# Patient Record
Sex: MALE | Race: BLACK OR AFRICAN AMERICAN | Employment: FULL TIME | URBAN - METROPOLITAN AREA
[De-identification: names, ages, dates, MRNs, and addresses within clinical notes are randomized per-mention and may not be internally consistent; named-entity substitution may affect disease eponyms.]

---

## 2017-03-16 ENCOUNTER — HOSPITAL ENCOUNTER (OUTPATIENT)
Dept: LAB | Age: 32
Discharge: HOME | End: 2017-03-16
Payer: SELF-PAY

## 2017-03-16 LAB
ALBUMIN SERPL BCP-MCNC: 3.9 G/DL (ref 3.4–5)
ALBUMIN/GLOB SERPL: 1 {RATIO} (ref 0.8–1.7)
ALP SERPL-CCNC: 74 U/L (ref 45–117)
ALT SERPL-CCNC: 71 U/L (ref 16–61)
ANION GAP BLD CALC-SCNC: 4 MMOL/L (ref 3–18)
AST SERPL W P-5'-P-CCNC: 33 U/L (ref 15–37)
BASOPHILS # BLD AUTO: 0 K/UL (ref 0–0.1)
BASOPHILS # BLD: 0 % (ref 0–2)
BILIRUB SERPL-MCNC: 0.2 MG/DL (ref 0.2–1)
BUN SERPL-MCNC: 14 MG/DL (ref 7–18)
BUN/CREAT SERPL: 12 (ref 12–20)
CALCIUM SERPL-MCNC: 8.8 MG/DL (ref 8.5–10.1)
CHLORIDE SERPL-SCNC: 109 MMOL/L (ref 100–108)
CHOLEST SERPL-MCNC: 179 MG/DL
CO2 SERPL-SCNC: 30 MMOL/L (ref 21–32)
CREAT SERPL-MCNC: 1.14 MG/DL (ref 0.6–1.3)
DIFFERENTIAL METHOD BLD: ABNORMAL
EOSINOPHIL # BLD: 0.1 K/UL (ref 0–0.4)
EOSINOPHIL NFR BLD: 2 % (ref 0–5)
ERYTHROCYTE [DISTWIDTH] IN BLOOD BY AUTOMATED COUNT: 14.7 % (ref 11.6–14.5)
GLOBULIN SER CALC-MCNC: 3.9 G/DL (ref 2–4)
GLUCOSE SERPL-MCNC: 104 MG/DL (ref 74–99)
HCT VFR BLD AUTO: 43.1 % (ref 36–48)
HDLC SERPL-MCNC: 46 MG/DL (ref 40–60)
HDLC SERPL: 3.9 {RATIO} (ref 0–5)
HGB BLD-MCNC: 13.9 G/DL (ref 13–16)
LDLC SERPL CALC-MCNC: 110.2 MG/DL (ref 0–100)
LIPID PROFILE,FLP: ABNORMAL
LYMPHOCYTES # BLD AUTO: 41 % (ref 21–52)
LYMPHOCYTES # BLD: 2.2 K/UL (ref 0.9–3.6)
MCH RBC QN AUTO: 27 PG (ref 24–34)
MCHC RBC AUTO-ENTMCNC: 32.3 G/DL (ref 31–37)
MCV RBC AUTO: 83.9 FL (ref 74–97)
MONOCYTES # BLD: 0.6 K/UL (ref 0.05–1.2)
MONOCYTES NFR BLD AUTO: 11 % (ref 3–10)
NEUTS SEG # BLD: 2.4 K/UL (ref 1.8–8)
NEUTS SEG NFR BLD AUTO: 46 % (ref 40–73)
PLATELET # BLD AUTO: 317 K/UL (ref 135–420)
PMV BLD AUTO: 11 FL (ref 9.2–11.8)
POTASSIUM SERPL-SCNC: 4.1 MMOL/L (ref 3.5–5.5)
PROT SERPL-MCNC: 7.8 G/DL (ref 6.4–8.2)
RBC # BLD AUTO: 5.14 M/UL (ref 4.7–5.5)
SODIUM SERPL-SCNC: 143 MMOL/L (ref 136–145)
TRIGL SERPL-MCNC: 114 MG/DL (ref ?–150)
VLDLC SERPL CALC-MCNC: 22.8 MG/DL
WBC # BLD AUTO: 5.4 K/UL (ref 4.6–13.2)

## 2017-03-16 PROCEDURE — 36415 COLL VENOUS BLD VENIPUNCTURE: CPT | Performed by: INTERNAL MEDICINE

## 2017-03-16 PROCEDURE — 85025 COMPLETE CBC W/AUTO DIFF WBC: CPT | Performed by: INTERNAL MEDICINE

## 2017-03-16 PROCEDURE — 80053 COMPREHEN METABOLIC PANEL: CPT | Performed by: INTERNAL MEDICINE

## 2017-03-16 PROCEDURE — 80061 LIPID PANEL: CPT | Performed by: INTERNAL MEDICINE

## 2017-03-24 ENCOUNTER — HOSPITAL ENCOUNTER (OUTPATIENT)
Dept: LAB | Age: 32
Discharge: HOME | End: 2017-03-24
Payer: SELF-PAY

## 2017-03-24 LAB
ANION GAP BLD CALC-SCNC: 9 MMOL/L (ref 3–18)
BUN SERPL-MCNC: 22 MG/DL (ref 7–18)
BUN/CREAT SERPL: 19 (ref 12–20)
CALCIUM SERPL-MCNC: 9.1 MG/DL (ref 8.5–10.1)
CHLORIDE SERPL-SCNC: 109 MMOL/L (ref 100–108)
CO2 SERPL-SCNC: 27 MMOL/L (ref 21–32)
CREAT SERPL-MCNC: 1.18 MG/DL (ref 0.6–1.3)
ERYTHROCYTE [DISTWIDTH] IN BLOOD BY AUTOMATED COUNT: 14.8 % (ref 11.6–14.5)
GLUCOSE SERPL-MCNC: 106 MG/DL (ref 74–99)
HCT VFR BLD AUTO: 44.7 % (ref 36–48)
HGB BLD-MCNC: 14.7 G/DL (ref 13–16)
MCH RBC QN AUTO: 27.2 PG (ref 24–34)
MCHC RBC AUTO-ENTMCNC: 32.9 G/DL (ref 31–37)
MCV RBC AUTO: 82.6 FL (ref 74–97)
PLATELET # BLD AUTO: 283 K/UL (ref 135–420)
PMV BLD AUTO: 10.8 FL (ref 9.2–11.8)
POTASSIUM SERPL-SCNC: 4.8 MMOL/L (ref 3.5–5.5)
RBC # BLD AUTO: 5.41 M/UL (ref 4.7–5.5)
SODIUM SERPL-SCNC: 145 MMOL/L (ref 136–145)
WBC # BLD AUTO: 6.6 K/UL (ref 4.6–13.2)

## 2017-03-24 PROCEDURE — 85027 COMPLETE CBC AUTOMATED: CPT | Performed by: SURGERY

## 2017-03-24 PROCEDURE — 36415 COLL VENOUS BLD VENIPUNCTURE: CPT | Performed by: SURGERY

## 2017-03-24 PROCEDURE — 80048 BASIC METABOLIC PNL TOTAL CA: CPT | Performed by: SURGERY

## 2017-09-03 ENCOUNTER — HOSPITAL ENCOUNTER (EMERGENCY)
Age: 32
Discharge: HOME | End: 2017-09-03
Attending: EMERGENCY MEDICINE
Payer: SELF-PAY

## 2017-09-03 VITALS
BODY MASS INDEX: 30.53 KG/M2 | TEMPERATURE: 98.4 F | HEIGHT: 66 IN | DIASTOLIC BLOOD PRESSURE: 66 MMHG | WEIGHT: 190 LBS | RESPIRATION RATE: 18 BRPM | SYSTOLIC BLOOD PRESSURE: 115 MMHG | HEART RATE: 74 BPM | OXYGEN SATURATION: 98 %

## 2017-09-03 DIAGNOSIS — K08.89 DENTALGIA: Primary | ICD-10-CM

## 2017-09-03 PROCEDURE — 99282 EMERGENCY DEPT VISIT SF MDM: CPT

## 2017-09-03 RX ORDER — PENICILLIN V POTASSIUM 500 MG/1
500 TABLET, FILM COATED ORAL 4 TIMES DAILY
Qty: 28 TAB | Refills: 0 | Status: SHIPPED | OUTPATIENT
Start: 2017-09-03 | End: 2017-09-10

## 2017-09-03 RX ORDER — TRAMADOL HYDROCHLORIDE 50 MG/1
50 TABLET ORAL
Qty: 5 TAB | Refills: 0 | Status: SHIPPED | OUTPATIENT
Start: 2017-09-03 | End: 2018-02-08

## 2017-09-03 NOTE — ED PROVIDER NOTES
HPI Comments: 9:26 AM Robinson Johnson. is a 27 y.o. male with history of HTN who presents to the ED c/o tooth and jaw pain. Pt reports that his L mandibular molar broke \"a while ago\" but 1 week ago he began to experience swelling and pain on the L side of his mouth. Pt notes he has been taking Ibuprofen and Tylenol with minimal relief and has not seen a dentist. He also had recent hernia surgery. Pt denies fever, chills, N/VD or any other acute sx. PCP: Miki Benton M.D     The history is provided by the patient. No  was used. Past Medical History:   Diagnosis Date    GERD (gastroesophageal reflux disease)     Hypertension     Inguinal hernia        History reviewed. No pertinent surgical history. Family History:   Problem Relation Age of Onset    Diabetes Mother     Hypertension Mother        Social History     Social History    Marital status:      Spouse name: N/A    Number of children: N/A    Years of education: N/A     Occupational History    Not on file. Social History Main Topics    Smoking status: Current Every Day Smoker     Packs/day: 0.50     Years: 10.00    Smokeless tobacco: Never Used    Alcohol use Yes      Comment: states he drinks twice a week    Drug use: No    Sexual activity: Yes     Partners: Female     Other Topics Concern    Not on file     Social History Narrative         ALLERGIES: Erythromycin and Azithromycin    Review of Systems   Constitutional: Negative. Negative for chills and fever. HENT: Positive for dental problem and facial swelling (L sided jaw). Negative for congestion. Eyes: Negative. Negative for visual disturbance. Respiratory: Negative. Negative for chest tightness and shortness of breath. Cardiovascular: Negative. Negative for chest pain and leg swelling. Gastrointestinal: Negative. Negative for abdominal pain, diarrhea, nausea and vomiting. Genitourinary: Negative.   Negative for difficulty urinating and dysuria. Musculoskeletal: Negative. Negative for back pain and myalgias. Skin: Negative. Negative for rash and wound. Neurological: Negative. Negative for dizziness, speech difficulty, weakness and light-headedness. Psychiatric/Behavioral: Negative. Negative for self-injury. All other systems reviewed and are negative. Vitals:    09/03/17 0913   BP: 115/66   Pulse: 74   Resp: 18   Temp: 98.4 °F (36.9 °C)   SpO2: 98%   Weight: 86.2 kg (190 lb)   Height: 5' 6\" (1.676 m)            Physical Exam   Constitutional: He appears well-developed and well-nourished. No distress. HENT:   Head: Normocephalic and atraumatic. Mouth/Throat: Oropharynx is clear and moist and mucous membranes are normal. No trismus in the jaw. Dental abscesses and dental caries present. No oropharyngeal exudate or posterior oropharyngeal edema.       (+) percussion tenderness   Eyes: EOM are normal. No scleral icterus. Neck: Normal range of motion. Neck supple. No JVD present. No thyromegaly present. Cardiovascular: Normal rate, regular rhythm, S1 normal and S2 normal.  Exam reveals no gallop and no friction rub. No murmur heard. Pulmonary/Chest: Effort normal and breath sounds normal. No accessory muscle usage. No respiratory distress. Abdominal: Soft. Normal appearance. He exhibits no distension. There is no tenderness. There is no rigidity, no rebound and no guarding. Musculoskeletal: Normal range of motion. He exhibits no edema or tenderness. Neurological: He is alert. He has normal strength. No sensory deficit. Coordination normal.   Skin: Skin is warm and intact. No rash noted. Psychiatric: He has a normal mood and affect. His speech is normal and behavior is normal.   Vitals reviewed.        MDM  Number of Diagnoses or Management Options  Dentalgia:   Diagnosis management comments: No drainable abscess on exam, will put on course of abx and encourage f/u with dentist.  Discussed return precautions. ED Course       Procedures           Vitals:  Patient Vitals for the past 12 hrs:   Temp Pulse Resp BP SpO2   09/03/17 0913 98.4 °F (36.9 °C) 74 18 115/66 98 %        Medications ordered:   Medications - No data to display      Lab findings:  No results found for this or any previous visit (from the past 12 hour(s)). EKG interpretation by ED Physician:       X-Ray, CT or other radiology findings or impressions:  No orders to display       Orders:  No orders of the defined types were placed in this encounter. Reevaluation, Progress notes, Consult notes, or additional Procedure notes:       Disposition:  Diagnosis: No diagnosis found. Disposition:     Follow-up Information     None           Patient's Medications   Start Taking    No medications on file   Continue Taking    HYDROCHLOROTHIAZIDE (HYDRODIURIL) 12.5 MG TABLET    Take 12.5 mg by mouth daily. These Medications have changed    No medications on file   Stop Taking    NAPROXEN (NAPROSYN) 500 MG TABLET    Take 500 mg by mouth two (2) times daily (with meals). OMEPRAZOLE (PRILOSEC) 20 MG CAPSULE    Take 20 mg by mouth daily. OXYCODONE-ACETAMINOPHEN (PERCOCET) 5-325 MG PER TABLET    Take 1 Tab by mouth every four (4) hours as needed for Pain. Max Daily Amount: 6 Tabs. Scribe Attestation           Katt Pickens acting as a scribe for and in the presence of Coty Marie MD              September 03, 2017 at 9:25 AM                            Provider Attestation:           I personally performed the services described in the documentation, reviewed the documentation, as recorded by the scribe in my presence, and it accurately and completely records my words and actions.  September 03, 2017 at 9:25 AM - Coty Marie MD

## 2017-09-03 NOTE — DISCHARGE INSTRUCTIONS
IF YOU HAVE SEVERE PAIN, FEVER, TROUBLE SWALLOWING OR BREATHING, OR ANY OTHER WORRYING SIGNS THEN RETURN TO THE ER RIGHT AWAY. Tooth and Gum Pain: Care Instructions  Your Care Instructions    The most common causes of dental pain are tooth decay and gum disease. Pain can also be caused by an infection of the tooth (abscess) or the gums. Or you may have pain from a broken or cracked tooth. Other causes of pain include infection and damage to a tooth from nervous grinding of your teeth. A wisdom tooth can be painful when it is coming in but cannot break through the gum. It can also be painful when the tooth is only partway in and extra gum tissue has formed around it. The tissue can get inflamed (pericoronitis), and sometimes it gets infected. Prompt dental care can help find the cause of your toothache and keep the tooth from dying or gum disease from getting worse. Self-care at home may reduce your pain and discomfort. Follow-up care is a key part of your treatment and safety. Be sure to make and go to all appointments, and call your dentist or doctor if you are having problems. It's also a good idea to know your test results and keep a list of the medicines you take. How can you care for yourself at home? · To reduce pain and facial swelling, put an ice or cold pack on the outside of your cheek for 10 to 20 minutes at a time. Put a thin cloth between the ice and your skin. Do not use heat. · If your doctor prescribed antibiotics, take them as directed. Do not stop taking them just because you feel better. You need to take the full course of antibiotics. · Ask your doctor if you can take an over-the-counter pain medicine, such as acetaminophen (Tylenol), ibuprofen (Advil, Motrin), or naproxen (Aleve). Be safe with medicines. Read and follow all instructions on the label. · Avoid very hot, cold, or sweet foods and drinks if they increase your pain.   · Rinse your mouth with warm salt water every 2 hours to help relieve pain and swelling. Mix 1 teaspoon of salt in 8 ounces of water. · Talk to your dentist about using special toothpaste for sensitive teeth. To reduce pain on contact with heat or cold or when brushing, brush with this toothpaste regularly or rub a small amount of the paste on the sensitive area with a clean finger 2 or 3 times a day. Floss gently between your teeth. · Do not smoke or use spit tobacco. Tobacco use can make gum problems worse, decreases your ability to fight infection in your gums, and delays healing. If you need help quitting, talk to your doctor about stop-smoking programs and medicines. These can increase your chances of quitting for good. When should you call for help? Call 911 anytime you think you may need emergency care. For example, call if:  · You have trouble breathing. Call your dentist or doctor now or seek immediate medical care if:  · You have signs of infection, such as:  ¨ Increased pain, swelling, warmth, or redness. ¨ Red streaks leading from the area. ¨ Pus draining from the area. ¨ A fever. Watch closely for changes in your health, and be sure to contact your doctor if:  · You do not get better as expected. Where can you learn more? Go to http://mo-go.info/. Enter 0363 6671815 in the search box to learn more about \"Tooth and Gum Pain: Care Instructions. \"  Current as of: August 11, 2016  Content Version: 11.3  © 7559-2308 [employer]. Care instructions adapted under license by [employer] (which disclaims liability or warranty for this information). If you have questions about a medical condition or this instruction, always ask your healthcare professional. [place] [address] any warranty or liability for your use of this information.

## 2017-09-03 NOTE — ED NOTES
I have reviewed discharge instructions with the patient. The patient verbalized understanding. Current Discharge Medication List      START taking these medications    Details   penicillin v potassium (VEETID) 500 mg tablet Take 1 Tab by mouth four (4) times daily for 7 days. Qty: 28 Tab, Refills: 0      traMADol (ULTRAM) 50 mg tablet Take 1 Tab by mouth every eight (8) hours as needed for Pain.  Max Daily Amount: 150 mg.  Qty: 5 Tab, Refills: 0         Patient armband removed and shredded

## 2018-02-08 ENCOUNTER — HOSPITAL ENCOUNTER (EMERGENCY)
Age: 33
Discharge: HOME | End: 2018-02-08
Attending: EMERGENCY MEDICINE
Payer: COMMERCIAL

## 2018-02-08 VITALS
OXYGEN SATURATION: 99 % | TEMPERATURE: 99.6 F | DIASTOLIC BLOOD PRESSURE: 99 MMHG | SYSTOLIC BLOOD PRESSURE: 152 MMHG | BODY MASS INDEX: 34.55 KG/M2 | HEART RATE: 120 BPM | WEIGHT: 215 LBS | RESPIRATION RATE: 20 BRPM | HEIGHT: 66 IN

## 2018-02-08 DIAGNOSIS — R03.0 ELEVATED BLOOD PRESSURE READING: ICD-10-CM

## 2018-02-08 DIAGNOSIS — J11.1 FLU SYNDROME: Primary | ICD-10-CM

## 2018-02-08 PROCEDURE — 99283 EMERGENCY DEPT VISIT LOW MDM: CPT

## 2018-02-08 PROCEDURE — 74011250637 HC RX REV CODE- 250/637: Performed by: NURSE PRACTITIONER

## 2018-02-08 RX ORDER — PROMETHAZINE HYDROCHLORIDE 25 MG/1
25 TABLET ORAL
Qty: 12 TAB | Refills: 0 | Status: SHIPPED | OUTPATIENT
Start: 2018-02-08 | End: 2019-02-03

## 2018-02-08 RX ORDER — IBUPROFEN 600 MG/1
600 TABLET ORAL
Qty: 20 TAB | Refills: 0 | Status: SHIPPED | OUTPATIENT
Start: 2018-02-08 | End: 2019-02-03

## 2018-02-08 RX ORDER — ONDANSETRON HYDROCHLORIDE 8 MG/1
8 TABLET, FILM COATED ORAL
Status: COMPLETED | OUTPATIENT
Start: 2018-02-08 | End: 2018-02-08

## 2018-02-08 RX ORDER — OSELTAMIVIR PHOSPHATE 75 MG/1
75 CAPSULE ORAL 2 TIMES DAILY
Qty: 10 CAP | Refills: 0 | Status: SHIPPED | OUTPATIENT
Start: 2018-02-08 | End: 2018-02-13

## 2018-02-08 RX ORDER — IBUPROFEN 600 MG/1
600 TABLET ORAL
Status: COMPLETED | OUTPATIENT
Start: 2018-02-08 | End: 2018-02-08

## 2018-02-08 RX ADMIN — ONDANSETRON HYDROCHLORIDE 8 MG: 8 TABLET, FILM COATED ORAL at 15:34

## 2018-02-08 RX ADMIN — IBUPROFEN 600 MG: 600 TABLET ORAL at 15:34

## 2018-02-08 NOTE — DISCHARGE INSTRUCTIONS
Influenza (Flu): Care Instructions  Your Care Instructions    Influenza (flu) is an infection in the lungs and breathing passages. It is caused by the influenza virus. There are different strains, or types, of the flu virus from year to year. Unlike the common cold, the flu comes on suddenly and the symptoms, such as a cough, congestion, fever, chills, fatigue, aches, and pains, are more severe. These symptoms may last up to 10 days. Although the flu can make you feel very sick, it usually doesn't cause serious health problems. Home treatment is usually all you need for flu symptoms. But your doctor may prescribe antiviral medicine to prevent other health problems, such as pneumonia, from developing. Older people and those who have a long-term health condition, such as lung disease, are most at risk for having pneumonia or other health problems. Follow-up care is a key part of your treatment and safety. Be sure to make and go to all appointments, and call your doctor if you are having problems. It's also a good idea to know your test results and keep a list of the medicines you take. How can you care for yourself at home? · Get plenty of rest.  · Drink plenty of fluids, enough so that your urine is light yellow or clear like water. If you have kidney, heart, or liver disease and have to limit fluids, talk with your doctor before you increase the amount of fluids you drink. · Take an over-the-counter pain medicine if needed, such as acetaminophen (Tylenol), ibuprofen (Advil, Motrin), or naproxen (Aleve), to relieve fever, headache, and muscle aches. Read and follow all instructions on the label. No one younger than 20 should take aspirin. It has been linked to Reye syndrome, a serious illness. · Do not smoke. Smoking can make the flu worse. If you need help quitting, talk to your doctor about stop-smoking programs and medicines. These can increase your chances of quitting for good.   · Breathe moist air from a hot shower or from a sink filled with hot water to help clear a stuffy nose. · Before you use cough and cold medicines, check the label. These medicines may not be safe for young children or for people with certain health problems. · If the skin around your nose and lips becomes sore, put some petroleum jelly on the area. · To ease coughing:  ¨ Drink fluids to soothe a scratchy throat. ¨ Suck on cough drops or plain hard candy. ¨ Take an over-the-counter cough medicine that contains dextromethorphan to help you get some sleep. Read and follow all instructions on the label. ¨ Raise your head at night with an extra pillow. This may help you rest if coughing keeps you awake. · Take any prescribed medicine exactly as directed. Call your doctor if you think you are having a problem with your medicine. To avoid spreading the flu  · Wash your hands regularly, and keep your hands away from your face. · Stay home from school, work, and other public places until you are feeling better and your fever has been gone for at least 24 hours. The fever needs to have gone away on its own without the help of medicine. · Ask people living with you to talk to their doctors about preventing the flu. They may get antiviral medicine to keep from getting the flu from you. · To prevent the flu in the future, get a flu vaccine every fall. Encourage people living with you to get the vaccine. · Cover your mouth when you cough or sneeze. When should you call for help? Call 911 anytime you think you may need emergency care. For example, call if:  ? · You have severe trouble breathing. ?Call your doctor now or seek immediate medical care if:  ? · You have new or worse trouble breathing. ? · You seem to be getting much sicker. ? · You feel very sleepy or confused. ? · You have a new or higher fever. ? · You get a new rash. ? Watch closely for changes in your health, and be sure to contact your doctor if:  ? · You begin to get better and then get worse. ? · You are not getting better after 1 week. Where can you learn more? Go to http://mo-go.info/. Enter Q055 in the search box to learn more about \"Influenza (Flu): Care Instructions. \"  Current as of: May 12, 2017  Content Version: 11.4  © 4500-5947 [employer]. Care instructions adapted under license by [employer] (which disclaims liability or warranty for this information). If you have questions about a medical condition or this instruction, always ask your healthcare professional. [address] any warranty or liability for your use of this information. [employer] Activation    Thank you for requesting access to [employer]. Please follow the instructions below to securely access and download your online medical record. Calendly allows you to send messages to your doctor, view your test results, renew your prescriptions, schedule appointments, and more. How Do I Sign Up? 1. In your internet browser, go to [email or web address]  2. Click on the First Time User? Click Here link in the Sign In box. You will be redirect to the New Member Sign Up page. 3. Enter your Calendly Access Code exactly as it appears below. You will not need to use this code after youve completed the sign-up process. If you do not sign up before the expiration date, you must request a new code. Calendly Access Code: Activation code not generated  Current Calendly Status: Active (This is the date your Calendly access code will )    4. Enter the last four digits of your Social Security Number (xxxx) and Date of Birth (mm/dd/yyyy) as indicated and click Submit. You will be taken to the next sign-up page. 5. Create a Calendly ID. This will be your Calendly login ID and cannot be changed, so think of one that is secure and easy to remember. 6. Create a Calendly password. You can change your password at any time.   7. Enter your Password Reset Question and Answer. This can be used at a later time if you forget your password. 8. Enter your e-mail address. You will receive e-mail notification when new information is available in [address] 9. Click Sign Up. You can now view and download portions of your medical record. 10. Click the Download Summary menu link to download a portable copy of your medical information. Additional Information    If you have questions, please visit the Frequently Asked Questions section of the [facility] website at [email or web address] Remember, [facility] is NOT to be used for urgent needs. For medical emergencies, dial 911.

## 2018-02-08 NOTE — ED TRIAGE NOTES
Patient states onset of generalized body aches, sweats and chills today. Patient states multiple episodes of diarrhea and vomiting.

## 2018-02-08 NOTE — ED PROVIDER NOTES
HPI Comments: 3:24 PM   27 y.o. male presents to ED C/O body aches. Patient reports HX of HTN, GERD. Patient reports he woke up this morning feeling sick. Patient reports chills, sweats, body aches, all started this morning. Patient reports he is able to keep down water but has vomited 2-3 times today after trying to eat. Patient took tylenol just prior to arrival.  Patient reports intermittent abdominal cramping, associated with nausea/ vomiting, and diarrhea. Patient denies CP, SOB. Patient denies any other symptoms or complaints. The history is provided by the patient. History limited by: No language barrier. Past Medical History:   Diagnosis Date    GERD (gastroesophageal reflux disease)     Hypertension     Inguinal hernia        History reviewed. No pertinent surgical history. Family History:   Problem Relation Age of Onset    Diabetes Mother     Hypertension Mother        Social History     Social History    Marital status:      Spouse name: N/A    Number of children: N/A    Years of education: N/A     Occupational History    Not on file. Social History Main Topics    Smoking status: Current Every Day Smoker     Packs/day: 0.50     Years: 10.00    Smokeless tobacco: Never Used    Alcohol use Yes      Comment: states he drinks twice a week    Drug use: No    Sexual activity: Yes     Partners: Female     Other Topics Concern    Not on file     Social History Narrative         ALLERGIES: Erythromycin and Azithromycin    Review of Systems   Constitutional: Positive for appetite change, chills and fatigue. Negative for activity change and fever. HENT: Negative for congestion, ear pain, rhinorrhea and sore throat. Eyes: Negative for pain, discharge, redness and itching. Respiratory: Negative for cough, chest tightness, shortness of breath and wheezing. Cardiovascular: Negative for chest pain and palpitations.    Gastrointestinal: Positive for abdominal pain, nausea and vomiting. Negative for blood in stool, constipation and diarrhea. Endocrine: Negative for polyuria. Genitourinary: Negative for discharge, dysuria, flank pain, hematuria, penile pain and testicular pain. Musculoskeletal: Positive for myalgias. Negative for back pain, joint swelling and neck pain. Skin: Negative for rash and wound. Allergic/Immunologic: Negative for immunocompromised state. Neurological: Negative for dizziness, weakness, light-headedness, numbness and headaches. Hematological: Negative for adenopathy. Psychiatric/Behavioral: Negative for agitation and confusion. The patient is not nervous/anxious. All other systems reviewed and are negative. Vitals:    02/08/18 1524 02/08/18 1651   BP: (!) 152/99    Pulse: (!) 124 (!) 120   Resp: 20    Temp: 100.1 °F (37.8 °C) 99.6 °F (37.6 °C)   SpO2: 97% 99%   Weight: 97.5 kg (215 lb)    Height: 5' 6\" (1.676 m)             Physical Exam   Constitutional: He is oriented to person, place, and time. He appears well-developed and well-nourished. No distress. HENT:   Head: Atraumatic. Mouth/Throat: Oropharynx is clear and moist. No oropharyngeal exudate. Eyes: Conjunctivae and EOM are normal.   Cardiovascular: Regular rhythm. Tachycardia present. Pulmonary/Chest: Effort normal and breath sounds normal. No respiratory distress. He has no wheezes. He has no rales. Abdominal: Soft. Normal appearance and bowel sounds are normal. There is generalized tenderness. There is no rigidity, no rebound, no guarding and no CVA tenderness. Musculoskeletal: Normal range of motion. Neurological: He is alert and oriented to person, place, and time. He exhibits normal muscle tone. Coordination normal.   Skin: Skin is warm and dry. No rash noted. He is not diaphoretic. No erythema. No pallor. Nursing note and vitals reviewed.        MDM  Number of Diagnoses or Management Options  Elevated blood pressure reading:   Flu syndrome: Diagnosis management comments: Clinical Impressio -flu syndrome    MDM:  Patient's HPI and PE is consistent with flu, symptoms started less than 24 hours ago, will discharge with diagnosis of flu. Low concern for acute abdominal abnormality, patient has no focal TTP, and reports feeling much better after zofran. Patient educated to return to the ED for any new or worsening symptoms. Patient has clear lung sounds, no indication for chest xray at this time, denies cough also. Patient's fever improved while in department and patient reports he is feeling better. Patient referred to PCP for further evaluation of elevated blood pressure. Patient denies questions. ED Course       Procedures                   RESULTS:    No orders to display       Labs Reviewed - No data to display    No results found for this or any previous visit (from the past 12 hour(s)). PROGRESS NOTE:   3:24 PM   Initial assessment completed. Written by Adilia OWENS    One or more blood pressure readings were noted elevated during the Pt's presentation in the emergency department this date. This abnormal reading has been cited in the Pt's diagnosis, and they have been encouraged to follow up with their primary care physician, or referred to a consultant for further evaluation and treatment. DISCHARGE NOTE:  4:59 PM   Joselin Truong Jr.'s  results have been reviewed with him. He has been counseled regarding his diagnosis, treatment, and plan. He verbally conveys understanding and agreement of the signs, symptoms, diagnosis, treatment and prognosis and additionally agrees to follow up as discussed. He also agrees with the care-plan and conveys that all of his questions have been answered.   I have also provided discharge instructions for him that include: educational information regarding their diagnosis and treatment, and list of reasons why they would want to return to the ED prior to their follow-up appointment, should his condition change. CLINICAL IMPRESSION:    1. Flu syndrome    2. Elevated blood pressure reading        AFTER VISIT PLAN:    Current Discharge Medication List      START taking these medications    Details   oseltamivir (TAMIFLU) 75 mg capsule Take 1 Cap by mouth two (2) times a day for 5 days. Qty: 10 Cap, Refills: 0      promethazine (PHENERGAN) 25 mg tablet Take 1 Tab by mouth every six (6) hours as needed. Qty: 12 Tab, Refills: 0      ibuprofen (MOTRIN) 600 mg tablet Take 1 Tab by mouth every six (6) hours as needed for Pain.   Qty: 20 Tab, Refills: 0              Follow-up Information     Follow up With Details Comments Contact Lee Solomon MD Schedule an appointment as soon as possible for a visit in 3 days Further evaluation [phone]             Written by Brice OWENS

## 2018-02-08 NOTE — LETTER
[address] [place] [address] [facility] 
[address] [facility] [address] [phone] Work/School Note Date: 2/8/2018 To Whom It May concern: 
 
Dot Youssef was seen and treated today in the emergency room by the following provider(s): 
Attending Provider: Martha Murray MD 
Nurse Practitioner: Mallory Duque NP. Dot Youssef may return to work on 02/12/2018. Sincerely, Mallory Duque NP

## 2018-02-08 NOTE — ED NOTES
Lacie Lane is a 27 y.o. male that was discharged in stable. Pt was accompanied by self. Pt is driving. The patients diagnosis, condition and treatment were explained to  patient and aftercare instructions were given. The patient verbalized understanding. Patient armband removed and shredded.

## 2019-02-03 ENCOUNTER — HOSPITAL ENCOUNTER (EMERGENCY)
Age: 34
Discharge: HOME | End: 2019-02-03
Attending: EMERGENCY MEDICINE
Payer: COMMERCIAL

## 2019-02-03 VITALS
BODY MASS INDEX: 36.16 KG/M2 | SYSTOLIC BLOOD PRESSURE: 149 MMHG | OXYGEN SATURATION: 98 % | RESPIRATION RATE: 16 BRPM | TEMPERATURE: 98 F | HEART RATE: 86 BPM | WEIGHT: 225 LBS | DIASTOLIC BLOOD PRESSURE: 101 MMHG | HEIGHT: 66 IN

## 2019-02-03 DIAGNOSIS — H61.23 BILATERAL IMPACTED CERUMEN: Primary | ICD-10-CM

## 2019-02-03 PROCEDURE — 99283 EMERGENCY DEPT VISIT LOW MDM: CPT

## 2019-02-03 PROCEDURE — 99282 EMERGENCY DEPT VISIT SF MDM: CPT

## 2019-02-03 PROCEDURE — 76010010392 HC REMOVAL IMPACTED WAX IRRIGATION/LVG UNI

## 2019-02-03 RX ORDER — HYDROCHLOROTHIAZIDE 25 MG/1
25 TABLET ORAL DAILY
COMMUNITY

## 2019-02-04 NOTE — DISCHARGE INSTRUCTIONS
Patient Education        Earwax Blockage: Care Instructions  Your Care Instructions    Earwax is a natural substance that protects the ear canal. Normally, earwax drains from the ears and does not cause problems. Sometimes earwax builds up and hardens. Earwax blockage (also called cerumen impaction) can cause some loss of hearing and pain. When wax is tightly packed, you will need to have your doctor remove it. Follow-up care is a key part of your treatment and safety. Be sure to make and go to all appointments, and call your doctor if you are having problems. It's also a good idea to know your test results and keep a list of the medicines you take. How can you care for yourself at home? · Do not try to remove earwax with cotton swabs, fingers, or other objects. This can make the blockage worse and damage the eardrum. · If your doctor recommends that you try to remove earwax at home:  ? Soften and loosen the earwax with warm mineral oil. You also can try hydrogen peroxide mixed with an equal amount of room temperature water. Place 2 drops of the fluid, warmed to body temperature, in the ear two times a day for up to 5 days. ? Once the wax is loose and soft, all that is usually needed to remove it from the ear canal is a gentle, warm shower. Direct the water into the ear, then tip your head to let the earwax drain out. Dry your ear thoroughly with a hair dryer set on low. Hold the dryer several inches from your ear. ? If the warm mineral oil and shower do not work, use an over-the-counter wax softener. Read and follow all instructions on the label. After using the wax softener, use an ear syringe to gently flush the ear. Make sure the flushing solution is body temperature. Cool or hot fluids in the ear can cause dizziness. When should you call for help?   Call your doctor now or seek immediate medical care if:    · Pus or blood drains from your ear.     · Your ears are ringing or feel full.     · You have a loss of hearing.    Watch closely for changes in your health, and be sure to contact your doctor if:    · You have pain or reduced hearing after 1 week of home treatment.     · You have any new symptoms, such as nausea or balance problems. Where can you learn more? Go to http://mo-go.info/. Enter T566 in the search box to learn more about \"Earwax Blockage: Care Instructions. \"  Current as of: September 23, 2018  Content Version: 11.9  © 1891-2738 [employer]. Care instructions adapted under license by [employer] (which disclaims liability or warranty for this information). If you have questions about a medical condition or this instruction, always ask your healthcare professional. [address] any warranty or liability for your use of this information.

## 2019-02-04 NOTE — ED NOTES
I have reviewed discharge instructions with the patient. The patient verbalized understanding. Patient armband removed and given to patient to take home. Patient was informed of the privacy risks if armband lost or stolen Current Discharge Medication List  
  
CONTINUE these medications which have NOT CHANGED Details  
hydroCHLOROthiazide (HYDRODIURIL) 25 mg tablet Take 25 mg by mouth daily.

## 2019-02-04 NOTE — ED PROVIDER NOTES
EMERGENCY DEPARTMENT HISTORY AND PHYSICAL EXAM 
 
Date: 2/3/2019 Patient Name: David Barrera History of Presenting Illness Chief Complaint Patient presents with  Ear Pain History Provided By: Patient Chief Complaint: muffled hearing Duration: 3 Days Timing:  Intermittent Location: right >> left Quality: Dull Severity: Mild Modifying Factors: none Associated Symptoms: congestion Additional History (Context): David Barerra is a 26 y.o. male with No significant past medical history who presents with congestion and muffled hearing in right ear. Pt states first the left ear had muffled hearing but the hearing has returned to normal. Denies fever, chills, sore throat, cough, CP, SOB or any other complaints. Has not used any medication to treat symptoms. PCP: Wale Bonds MD 
 
Current Outpatient Medications Medication Sig Dispense Refill  hydroCHLOROthiazide (HYDRODIURIL) 25 mg tablet Take 25 mg by mouth daily. Past History Past Medical History: 
Past Medical History:  
Diagnosis Date  GERD (gastroesophageal reflux disease)  Hypertension  Inguinal hernia Past Surgical History: 
History reviewed. No pertinent surgical history. Family History: 
Family History Problem Relation Age of Onset  Diabetes Mother  Hypertension Mother Social History: 
Social History Tobacco Use  Smoking status: Current Every Day Smoker Packs/day: 0.50 Years: 10.00 Pack years: 5.00  Smokeless tobacco: Never Used Substance Use Topics  Alcohol use: Yes Comment: states he drinks twice a week  Drug use: No  
 
 
Allergies: Allergies Allergen Reactions  Erythromycin Hives  Amoxicillin Hives  Azithromycin Hives Review of Systems Review of Systems Constitutional: Negative for chills and fever. HENT: Positive for congestion and hearing loss. Respiratory: Negative for cough. Cardiovascular: Negative. All other systems reviewed and are negative. All Other Systems Negative Physical Exam  
 
Vitals:  
 02/03/19 1937 BP: (!) 149/101 Pulse: 86 Resp: 16 Temp: 98 °F (36.7 °C) SpO2: 98% Weight: 102.1 kg (225 lb) Height: 5' 6\" (1.676 m) Physical Exam  
Constitutional: He is oriented to person, place, and time. He appears well-developed and well-nourished. No distress. HENT:  
Head: Normocephalic and atraumatic. Nose: Nose normal.  
Mouth/Throat: Oropharynx is clear and moist. No oropharyngeal exudate. Bilateral cerumen impaction Eyes: Conjunctivae and EOM are normal. Pupils are equal, round, and reactive to light. Right eye exhibits no discharge. Left eye exhibits no discharge. Neck: Normal range of motion. Neck supple. Cardiovascular: Normal rate and regular rhythm. Pulmonary/Chest: Effort normal and breath sounds normal. He has no wheezes. Musculoskeletal: Normal range of motion. Lymphadenopathy:  
  He has no cervical adenopathy. Neurological: He is alert and oriented to person, place, and time. Skin: Skin is warm and dry. No rash noted. He is not diaphoretic. Psychiatric: He has a normal mood and affect. Diagnostic Study Results Labs - No results found for this or any previous visit (from the past 12 hour(s)). Radiologic Studies - No orders to display CT Results  (Last 48 hours) None CXR Results  (Last 48 hours) None Medical Decision Making I am the first provider for this patient. I reviewed the vital signs, available nursing notes, past medical history, past surgical history, family history and social history. Vital Signs-Reviewed the patient's vital signs. Pulse Oximetry Analysis - 98% on RA Records Reviewed: Nursing Notes Procedures: 
Procedures Provider Notes (Medical Decision Making): Ddx: OM, otitis externa, cerumen impaction, uri, other. Cerumen impaction bilat, unable to see TMs, will irrigate and re-eval. 7:45 PM 
8:01 PM Post irrigation: right TM clear; left TM still not visible and pt not able to tolerate further irrigation so will get irrigation kit from store and continue at home. MED RECONCILIATION: 
No current facility-administered medications for this encounter. Current Outpatient Medications Medication Sig  
 hydroCHLOROthiazide (HYDRODIURIL) 25 mg tablet Take 25 mg by mouth daily. Disposition: 
home DISCHARGE NOTE:  
 
Pt has been reexamined. Patient has no new complaints, changes, or physical findings. Care plan outlined and precautions discussed. All medications were reviewed with the patient; will d/c home. All of pt's questions and concerns were addressed. Patient was instructed and agrees to follow up with pcp, as well as to return to the ED upon further deterioration. Patient is ready to go home. Follow-up Information Follow up With Specialties Details Why Contact Info Prince Hope., MD Pediatrics   [address] [place] [address] [address] [place] [address] 
[phone] Current Discharge Medication List  
  
CONTINUE these medications which have NOT CHANGED Details  
hydroCHLOROthiazide (HYDRODIURIL) 25 mg tablet Take 25 mg by mouth daily. Diagnosis Clinical Impression: 1. Bilateral impacted cerumen

## 2019-04-20 ENCOUNTER — HOSPITAL ENCOUNTER (OUTPATIENT)
Dept: LAB | Age: 34
Discharge: HOME | End: 2019-04-20
Payer: COMMERCIAL

## 2019-04-20 LAB
ALBUMIN SERPL-MCNC: 3.9 G/DL (ref 3.4–5)
ALBUMIN/GLOB SERPL: 1.1 {RATIO} (ref 0.8–1.7)
ALP SERPL-CCNC: 74 U/L (ref 45–117)
ALT SERPL-CCNC: 96 U/L (ref 16–61)
ANION GAP SERPL CALC-SCNC: 5 MMOL/L (ref 3–18)
AST SERPL-CCNC: 77 U/L (ref 15–37)
BASOPHILS # BLD: 0 K/UL (ref 0–0.1)
BASOPHILS NFR BLD: 0 % (ref 0–2)
BILIRUB SERPL-MCNC: 0.4 MG/DL (ref 0.2–1)
BUN SERPL-MCNC: 14 MG/DL (ref 7–18)
BUN/CREAT SERPL: 13 (ref 12–20)
CALCIUM SERPL-MCNC: 9.4 MG/DL (ref 8.5–10.1)
CHLORIDE SERPL-SCNC: 107 MMOL/L (ref 100–108)
CHOLEST SERPL-MCNC: 183 MG/DL
CO2 SERPL-SCNC: 29 MMOL/L (ref 21–32)
CREAT SERPL-MCNC: 1.1 MG/DL (ref 0.6–1.3)
DIFFERENTIAL METHOD BLD: ABNORMAL
EOSINOPHIL # BLD: 0.2 K/UL (ref 0–0.4)
EOSINOPHIL NFR BLD: 2 % (ref 0–5)
ERYTHROCYTE [DISTWIDTH] IN BLOOD BY AUTOMATED COUNT: 15 % (ref 11.6–14.5)
GLOBULIN SER CALC-MCNC: 3.7 G/DL (ref 2–4)
GLUCOSE SERPL-MCNC: 101 MG/DL (ref 74–99)
HCT VFR BLD AUTO: 45.1 % (ref 36–48)
HDLC SERPL-MCNC: 37 MG/DL (ref 40–60)
HDLC SERPL: 4.9 {RATIO} (ref 0–5)
HGB BLD-MCNC: 14.4 G/DL (ref 13–16)
LDLC SERPL CALC-MCNC: 110.4 MG/DL (ref 0–100)
LIPID PROFILE,FLP: ABNORMAL
LYMPHOCYTES # BLD: 3.3 K/UL (ref 0.9–3.6)
LYMPHOCYTES NFR BLD: 42 % (ref 21–52)
MCH RBC QN AUTO: 25.9 PG (ref 24–34)
MCHC RBC AUTO-ENTMCNC: 31.9 G/DL (ref 31–37)
MCV RBC AUTO: 81.1 FL (ref 74–97)
MONOCYTES # BLD: 0.5 K/UL (ref 0.05–1.2)
MONOCYTES NFR BLD: 7 % (ref 3–10)
NEUTS SEG # BLD: 3.8 K/UL (ref 1.8–8)
NEUTS SEG NFR BLD: 49 % (ref 40–73)
PLATELET # BLD AUTO: 280 K/UL (ref 135–420)
PMV BLD AUTO: 11 FL (ref 9.2–11.8)
POTASSIUM SERPL-SCNC: 4.5 MMOL/L (ref 3.5–5.5)
PROT SERPL-MCNC: 7.6 G/DL (ref 6.4–8.2)
RBC # BLD AUTO: 5.56 M/UL (ref 4.7–5.5)
SODIUM SERPL-SCNC: 141 MMOL/L (ref 136–145)
T4 FREE SERPL-MCNC: 1 NG/DL (ref 0.7–1.5)
TRIGL SERPL-MCNC: 178 MG/DL (ref ?–150)
TSH SERPL DL<=0.05 MIU/L-ACNC: 0.5 UIU/ML (ref 0.36–3.74)
VLDLC SERPL CALC-MCNC: 35.6 MG/DL
WBC # BLD AUTO: 7.8 K/UL (ref 4.6–13.2)

## 2019-04-20 PROCEDURE — 36415 COLL VENOUS BLD VENIPUNCTURE: CPT

## 2019-04-20 PROCEDURE — 84439 ASSAY OF FREE THYROXINE: CPT

## 2019-04-20 PROCEDURE — 80053 COMPREHEN METABOLIC PANEL: CPT

## 2019-04-20 PROCEDURE — 84443 ASSAY THYROID STIM HORMONE: CPT

## 2019-04-20 PROCEDURE — 85025 COMPLETE CBC W/AUTO DIFF WBC: CPT

## 2019-04-20 PROCEDURE — 80061 LIPID PANEL: CPT

## 2019-07-16 ENCOUNTER — APPOINTMENT (OUTPATIENT)
Dept: GENERAL RADIOLOGY | Age: 34
End: 2019-07-16
Attending: PHYSICIAN ASSISTANT
Payer: COMMERCIAL

## 2019-07-16 ENCOUNTER — HOSPITAL ENCOUNTER (EMERGENCY)
Age: 34
Discharge: HOME | End: 2019-07-16
Attending: EMERGENCY MEDICINE
Payer: COMMERCIAL

## 2019-07-16 VITALS
RESPIRATION RATE: 18 BRPM | BODY MASS INDEX: 36.16 KG/M2 | WEIGHT: 225 LBS | HEART RATE: 87 BPM | OXYGEN SATURATION: 96 % | SYSTOLIC BLOOD PRESSURE: 148 MMHG | DIASTOLIC BLOOD PRESSURE: 92 MMHG | HEIGHT: 66 IN | TEMPERATURE: 99.2 F

## 2019-07-16 DIAGNOSIS — M54.16 CHRONIC LEFT LUMBAR RADICULOPATHY: Primary | ICD-10-CM

## 2019-07-16 PROCEDURE — 96372 THER/PROPH/DIAG INJ SC/IM: CPT

## 2019-07-16 PROCEDURE — 74011250636 HC RX REV CODE- 250/636: Performed by: PHYSICIAN ASSISTANT

## 2019-07-16 PROCEDURE — 72110 X-RAY EXAM L-2 SPINE 4/>VWS: CPT

## 2019-07-16 PROCEDURE — 99282 EMERGENCY DEPT VISIT SF MDM: CPT

## 2019-07-16 RX ORDER — PREDNISONE 20 MG/1
TABLET ORAL
Qty: 12 TAB | Refills: 0 | Status: SHIPPED | OUTPATIENT
Start: 2019-07-16

## 2019-07-16 RX ADMIN — METHYLPREDNISOLONE SODIUM SUCCINATE 125 MG: 125 INJECTION, POWDER, FOR SOLUTION INTRAMUSCULAR; INTRAVENOUS at 17:22

## 2019-07-16 NOTE — ED TRIAGE NOTES
Low back/left hip pain x 3 years.  Was seen 2 months ago and prescribed Naproxen and Flexeril but medications ran out

## 2019-07-16 NOTE — ED PROVIDER NOTES
EMERGENCY DEPARTMENT HISTORY AND PHYSICAL EXAM    Date: 7/16/2019  Patient Name: Emani Whiting History of Presenting Illness     Chief Complaint   Patient presents with    LOW BACK PAIN         History Provided By: Patient        Additional History (Context): Emani Whiting is a 26 y.o. male with A 1 year history of lumbar pain with intermittent radiculopathy to the left side who presents with a complaint of 9 out of 10 lumbar pain with radiation of pain down the left buttock to the lateral left hip. Patient denies numbness, weakness, paresthesias. No known history of back injury, however patient lifts heavy items at work daily. Patient denies dysuria. No other bowel or bladder dysfunction. No previous history of lumbar surgery or MRI of the spine. PCP: Corona Redd MD    Current Outpatient Medications   Medication Sig Dispense Refill    hydroCHLOROthiazide (HYDRODIURIL) 25 mg tablet Take 25 mg by mouth daily. Past History     Past Medical History:  Past Medical History:   Diagnosis Date    GERD (gastroesophageal reflux disease)     Hypertension     Inguinal hernia        Past Surgical History:  History reviewed. No pertinent surgical history. Family History:  Family History   Problem Relation Age of Onset    Diabetes Mother     Hypertension Mother        Social History:  Social History     Tobacco Use    Smoking status: Current Every Day Smoker     Packs/day: 0.50     Years: 10.00     Pack years: 5.00    Smokeless tobacco: Never Used   Substance Use Topics    Alcohol use: Yes     Comment: states he drinks twice a week    Drug use: No       Allergies: Allergies   Allergen Reactions    Erythromycin Hives    Amoxicillin Hives    Azithromycin Hives         Review of Systems   Review of Systems  Review of Systems   Constitutional: Negative for fatigue and fever. HENT: Negative for congestion. Respiratory: Negative for cough and shortness of breath. Cardiovascular: Negative for chest pain. Genitourinary: Negative for difficulty urinating and dysuria. Musculoskeletal:moderate to severe, lumbar pain without joint swelling or recent injury. Skin: Negative for wound. Neurological: Negative for dizziness and headaches. All other systems reviewed and are negative. All Other Systems Negative  Physical Exam     Vitals:    07/16/19 1710   BP: (!) 148/92   Pulse: 87   Resp: 18   Temp: 99.2 °F (37.3 °C)   SpO2: 96%   Weight: 102.1 kg (225 lb)   Height: 5' 6\" (1.676 m)     Physical Exam     Constitutional: Pt is oriented to person, place, and time. Pt appears well-developed and well-nourished. HENT:   Head: Normocephalic and atraumatic. Mouth/Throat: Oropharynx is clear and moist.   Eyes: Pupils are equal, round, and reactive to light. Right eye exhibits no discharge. Left eye exhibits no discharge. Neck: Normal range of motion. Neck supple. No tracheal deviation present. No thyromegaly present. Cardiovascular: Normal rate, regular rhythm and normal heart sounds. Exam reveals no friction rub. No murmur heard. Pulmonary/Chest: Effort normal and breath sounds normal. No respiratory distress. No wheezes or rales. Abdominal: Soft. no distension and no mass. There is no tenderness. There is no rebound and no guarding. Musculoskeletal: Normal range of motion. No edema or deformity. Vertebral spine is not TTP, there is no spasm present. Strength is 5/5 and equal DTR are intact. Neurological: Pt is alert and oriented to person, place, and time;  has normal reflexes. Skin: Skin is warm and dry. Psychiatric: Pt has a normal mood and affect;  behavior is normal. Judgment and thought content normal.           Diagnostic Study Results     Labs -   No results found for this or any previous visit (from the past 12 hour(s)).     Radiologic Studies -   XR SPINE LUMB MIN 4 V    (Results Pending)     CT Results  (Last 48 hours)    None        CXR Results (Last 48 hours)    None            Medical Decision Making   I am the first provider for this patient. I reviewed the vital signs, available nursing notes, past medical history, past surgical history, family history and social history. Vital Signs-Reviewed the patient's vital signs. Comparison:    Records Reviewed: Nursing Notes    Procedures:  Procedures    Provider Notes (Medical Decision Making):   No numbness, weakness or paresthesias. Pt states pain has been present for over one year. No new symptoms or injury. MED RECONCILIATION:  No current facility-administered medications for this encounter. Current Outpatient Medications   Medication Sig    hydroCHLOROthiazide (HYDRODIURIL) 25 mg tablet Take 25 mg by mouth daily. Disposition:  Home    DISCHARGE NOTE:     Pt has been reexamined. Patient has no new complaints, changes, or physical findings. Care plan outlined and precautions discussed. Results of xray and exam were reviewed with the patient. All medications were reviewed with the patient; will d/c home with prednisone. All of pt's questions and concerns were addressed. Patient was instructed and agrees to follow up with primary care, as well as to return to the ED upon further deterioration. Patient is ready to go home. Pt understands the next step is physical therapy. Follow-up Information    None         Current Discharge Medication List              Diagnosis     Clinical Impression:   1.  Chronic left lumbar radiculopathy

## 2019-12-15 ENCOUNTER — APPOINTMENT (OUTPATIENT)
Dept: GENERAL RADIOLOGY | Age: 34
End: 2019-12-15
Attending: EMERGENCY MEDICINE
Payer: COMMERCIAL

## 2019-12-15 ENCOUNTER — HOSPITAL ENCOUNTER (EMERGENCY)
Age: 34
Discharge: HOME | End: 2019-12-15
Attending: EMERGENCY MEDICINE
Payer: COMMERCIAL

## 2019-12-15 VITALS
WEIGHT: 220 LBS | TEMPERATURE: 98.2 F | HEART RATE: 90 BPM | HEIGHT: 66 IN | BODY MASS INDEX: 35.36 KG/M2 | DIASTOLIC BLOOD PRESSURE: 96 MMHG | RESPIRATION RATE: 18 BRPM | SYSTOLIC BLOOD PRESSURE: 145 MMHG | OXYGEN SATURATION: 97 %

## 2019-12-15 DIAGNOSIS — M76.51 PATELLAR TENDINITIS, RIGHT KNEE: Primary | ICD-10-CM

## 2019-12-15 DIAGNOSIS — R03.0 ELEVATED BLOOD PRESSURE READING: ICD-10-CM

## 2019-12-15 PROCEDURE — 99283 EMERGENCY DEPT VISIT LOW MDM: CPT

## 2019-12-15 PROCEDURE — 73564 X-RAY EXAM KNEE 4 OR MORE: CPT

## 2019-12-15 RX ORDER — IBUPROFEN 600 MG/1
600 TABLET ORAL
Qty: 20 TAB | Refills: 0 | Status: SHIPPED | OUTPATIENT
Start: 2019-12-15

## 2019-12-15 RX ORDER — IBUPROFEN 600 MG/1
600 TABLET ORAL
Qty: 20 TAB | Refills: 0 | Status: SHIPPED | OUTPATIENT
Start: 2019-12-15 | End: 2019-12-15

## 2019-12-15 NOTE — LETTER
Work Note December 15, 2019: To Whom It May concern: 
 
Suki Erickson was seen and evaluated today in the emergency room for an acute injury. Suki Erickson may return to work on 12/17/19. Duty restrictions as follows: limited walking, standing, bending right knee next 3-7 days as tolerated. Sincerely, Farhana Richey MD

## 2019-12-15 NOTE — ED PROVIDER NOTES
57-year-old male history of hypertension presents for evaluation of atraumatic right knee pain. Patient recalls no injury but has pain in the infrapatellar region of the right knee. No prior injuries. Onset over the last 4 days. Hurts to stand or bend the knee. No effusion. Past Medical History:   Diagnosis Date    GERD (gastroesophageal reflux disease)     Hypertension     Inguinal hernia        History reviewed. No pertinent surgical history.       Family History:   Problem Relation Age of Onset    Diabetes Mother     Hypertension Mother        Social History     Socioeconomic History    Marital status:      Spouse name: Not on file    Number of children: Not on file    Years of education: Not on file    Highest education level: Not on file   Occupational History    Not on file   Social Needs    Financial resource strain: Not on file    Food insecurity:     Worry: Not on file     Inability: Not on file    Transportation needs:     Medical: Not on file     Non-medical: Not on file   Tobacco Use    Smoking status: Current Every Day Smoker     Packs/day: 0.50     Years: 10.00     Pack years: 5.00    Smokeless tobacco: Never Used   Substance and Sexual Activity    Alcohol use: Yes     Comment: states he drinks twice a week    Drug use: No    Sexual activity: Yes     Partners: Female   Lifestyle    Physical activity:     Days per week: Not on file     Minutes per session: Not on file    Stress: Not on file   Relationships    Social connections:     Talks on phone: Not on file     Gets together: Not on file     Attends L[employer]-day service: Not on file     Active member of club or organization: Not on file     Attends meetings of clubs or organizations: Not on file     Relationship status: Not on file    Intimate partner violence:     Fear of current or ex partner: Not on file     Emotionally abused: Not on file     Physically abused: Not on file     Forced sexual activity: Not on file   Other Topics Concern    Not on file   Social History Narrative    Not on file         ALLERGIES: Erythromycin; Amoxicillin; and Azithromycin    Review of Systems   Constitutional: Negative for fever. Musculoskeletal: Negative for back pain, joint swelling and neck pain. All other systems reviewed and are negative. Vitals:    12/15/19 0612   BP: (!) 145/96   Pulse: 90   Resp: 18   Temp: 98.2 °F (36.8 °C)   SpO2: 97%   Weight: 99.8 kg (220 lb)   Height: 5' 6\" (1.676 m)            Physical Exam  Vitals signs and nursing note reviewed. Constitutional:       General: He is not in acute distress. Appearance: He is well-developed. HENT:      Head: Normocephalic and atraumatic. Eyes:      General: No scleral icterus. Cardiovascular:      Rate and Rhythm: Normal rate. Pulmonary:      Effort: Pulmonary effort is normal.   Musculoskeletal:      Comments: Right knee is tender along the infrapatellar tendon. No tenderness at the insertion site over the superior tibia. No palpable joint effusion. No ligamentous laxity. No tenderness over the medial or lateral joint line and crepitance on range of motion. The joint is not warm or erythematous. Skin:     General: Skin is warm and dry. Neurological:      Mental Status: He is alert and oriented to person, place, and time. X-ray of the right knee interpreted per myself as being negative for acute pathology. MDM  Number of Diagnoses or Management Options  Elevated blood pressure reading:   Patellar tendinitis, right knee:   Diagnosis management comments: Impression infrapatellar tendinitis right knee. Screening x-ray negative per my reading. Symptomatic treatment. No findings of septic joint or DVT at this time. Blood pressure elevated. Has history of hypertension. Procedures      Diagnosis:   1. Patellar tendinitis, right knee    2.  Elevated blood pressure reading          Disposition: home    Follow-up Information Follow up With Specialties Details Why Vanessa Braden., MD Pediatrics In 1 week for repeat blood pressure check, for recheck of ongoing symptoms [id number]  [phone]            Discharge Medication List as of 12/15/2019  7:31 AM      START taking these medications    Details   ibuprofen (MOTRIN) 600 mg tablet Take 1 Tab by mouth four (4) times daily as needed for Pain (with food). Prn knee pain and inflammation, Print, Disp-20 Tab, R-0         CONTINUE these medications which have NOT CHANGED    Details   hydroCHLOROthiazide (HYDRODIURIL) 25 mg tablet Take 25 mg by mouth daily. , Historical Med      predniSONE (DELTASONE) 20 mg tablet Days 1 and 2 take 3 tabs, days 3 and 4 take 2 tabs, then 1 tab daily until finished.  Take with breakfast., Normal, Disp-12 Tab, R-0

## 2019-12-15 NOTE — DISCHARGE INSTRUCTIONS
1.  Preliminary reading of knee x-ray of the knee is normal.  2.  RICE (rest, ice, compression, elevation). 3.  Ibuprofen 600 mg 3-4 times a day for knee pain. 4.  Apply ice packs for local swelling and pain. 5.  Blood pressure elevated to 145/96 today (normal less than 130/80). Continue hydrochlorothiazide and follow-up with PCP for blood pressure recheck in 1 to 2 weeks. 6.  Outpatient physical therapy if knee pain persists greater than 3 to 4 weeks.

## 2019-12-15 NOTE — ED NOTES
Yeyo Villa is a 37 y.o. male that was discharged in good condition. The patients diagnosis, condition and treatment were explained to  patient and aftercare instructions were given. The patient verbalized understanding. Patient armband removed and shredded.

## 2020-03-26 ENCOUNTER — HOSPITAL ENCOUNTER (OUTPATIENT)
Dept: OCCUPATIONAL MEDICINE | Age: 35
Discharge: HOME | End: 2020-03-26
Attending: EMERGENCY MEDICINE

## 2020-03-26 DIAGNOSIS — T14.90XA INJURY: ICD-10-CM

## 2022-07-11 ENCOUNTER — HOSPITAL ENCOUNTER (OUTPATIENT)
Dept: LAB | Age: 37
Discharge: HOME | End: 2022-07-11

## 2022-07-11 LAB — SENTARA SPECIMEN COL,SENBCF: NORMAL

## 2022-07-11 PROCEDURE — 99001 SPECIMEN HANDLING PT-LAB: CPT

## 2022-12-01 ENCOUNTER — HOSPITAL ENCOUNTER (EMERGENCY)
Age: 37
Discharge: HOME | End: 2022-12-01
Attending: STUDENT IN AN ORGANIZED HEALTH CARE EDUCATION/TRAINING PROGRAM
Payer: MEDICAID

## 2022-12-01 VITALS
HEIGHT: 66 IN | SYSTOLIC BLOOD PRESSURE: 124 MMHG | BODY MASS INDEX: 36.96 KG/M2 | RESPIRATION RATE: 16 BRPM | WEIGHT: 230 LBS | HEART RATE: 94 BPM | TEMPERATURE: 99 F | DIASTOLIC BLOOD PRESSURE: 90 MMHG | OXYGEN SATURATION: 97 %

## 2022-12-01 DIAGNOSIS — J06.9 VIRAL URI: Primary | ICD-10-CM

## 2022-12-01 PROCEDURE — 99282 EMERGENCY DEPT VISIT SF MDM: CPT

## 2022-12-01 NOTE — DISCHARGE INSTRUCTIONS
Recommend taking extra strength Tylenol every 4-6 hours and ibuprofen 600 mg every 6 hours as needed for pain and fevers. It is important to take these medications when you have a fever to keep your fever down as he will feel much better. Stay hydrated. Quarantine at home until you are feeling better.

## 2022-12-01 NOTE — ED TRIAGE NOTES
Patient states he developed chills, fever and sore throat yesterday. No fever today but woke up sweating. Now c/o body aches today.

## 2022-12-01 NOTE — ED NOTES
Venkat Nelson is a 42 y.o. male that was discharged in stable condition. The patients diagnosis, condition and treatment were explained to  patient and aftercare instructions were given. The patient verbalized understanding. Patient armband removed and shredded.

## 2022-12-01 NOTE — ED PROVIDER NOTES
HPI   Patient is a 17-year-old male who presents with a 1 day history of fever and sore throat. Has had some recent sick contacts. Denies any runny nose, vomiting, cough, chest pain or difficulty breathing. Denies any dysuria, hematuria urinary frequency. Denies any rashes or lesions. Does have some muscle pains throughout. Nothing specific or localized. Past Medical History:   Diagnosis Date    GERD (gastroesophageal reflux disease)     Hypertension     Inguinal hernia        No past surgical history on file.       Family History:   Problem Relation Age of Onset    Diabetes Mother     Hypertension Mother        Social History     Socioeconomic History    Marital status:      Spouse name: Not on file    Number of children: Not on file    Years of education: Not on file    Highest education level: Not on file   Occupational History    Not on file   Tobacco Use    Smoking status: Every Day     Packs/day: 0.50     Years: 10.00     Pack years: 5.00     Types: Cigarettes    Smokeless tobacco: Never   Substance and Sexual Activity    Alcohol use: Yes     Comment: states he drinks twice a week    Drug use: No    Sexual activity: Yes     Partners: Female   Other Topics Concern    Not on file   Social History Narrative    Not on file     Social Determinants of Health     Financial Resource Strain: Not on file   Food Insecurity: Not on file   Transportation Needs: Not on file   Physical Activity: Not on file   Stress: Not on file   Social Connections: Not on file   Intimate Partner Violence: Not on file   Housing Stability: Not on file         ALLERGIES: Erythromycin, Amoxicillin, and Azithromycin    Review of Systems  Constitutional: Positive for fever  HENT: No ear pain  Eyes: No change in vision  Respiratory: No SOB  Cardio: No chest pain  GI: No blood in stool  : No hematuria  MSK: No back pain  Skin: No rashes  Neuro: No headache    Vitals:    12/01/22 0956   BP: (!) 124/90   Pulse: 94   Resp: 16   Temp: 99 °F (37.2 °C)   SpO2: 97%   Weight: 104.3 kg (230 lb)   Height: 5' 6\" (1.676 m)            Physical Exam   General: No acute distress  Head: Normocephalic, atraumatic  Psych: Cooperative and alert  Eyes: No scleral icterus, normal conjunctiva  ENT: Moist oral mucosa, erythema, noted to posterior pharynx without any significant swelling to tonsils or soft palate or exudate, uvula hangs midline but is mildly swollen, no sinus tenderness  Neck: Supple, no anterior cervical adenopathy  CV: Regular rate and rhythm, no pitting edema, palpable radial pulses  Pulm: Clear breath sounds bilaterally without any wheezing or rhonchi, normal respiratory rate  GI: Normal bowel sounds, soft, non-tender  MSK: Moves all four extremities  Skin: No rashes  Neuro: Alert and conversive    MDM    Patient is a 40-year-old male who presents with symptoms consistent with a viral syndrome. Does not have any concerning findings of acute bacterial infection at this time. Patient overall appears well is currently afebrile here. Discussed different regimens to use to treat his symptoms at home. Discussed concerning findings to look out for. Patient stable for discharge at this time. Patient is in agreement with the plan to be discharged at this time. All the patient's questions were answered. Patient was given written instructions on the diagnosis, and states understanding of the plan moving forward. We did discuss important signs and symptoms that should prompt quick return to the emergency department. Disposition: Patient was discharged home in stable condition.   They will follow up with their primary care physician    Prescriptions: None    Diagnosis: Acute viral illness    Procedures

## 2022-12-01 NOTE — Clinical Note
[address] [facility] EMERGENCY DEPT  [id number] [id number]  [phone]    Work/School Note    Date: 12/1/2022    To Whom It May concern:    Mone Cline was seen and treated today in the emergency room by the following provider(s):  Attending Provider: Shanice Munson MD.      Mone Cline is excused from work/school on 12/1/2022 through 12/3/2022. He is medically clear to return to work/school on 12/4/2022.          Sincerely,          Carrie Henriquez MD

## 2023-04-02 ENCOUNTER — APPOINTMENT (OUTPATIENT)
Facility: HOSPITAL | Age: 38
End: 2023-04-02
Payer: MEDICAID

## 2023-04-02 ENCOUNTER — HOSPITAL ENCOUNTER (EMERGENCY)
Facility: HOSPITAL | Age: 38
Discharge: HOME | End: 2023-04-02
Attending: EMERGENCY MEDICINE
Payer: MEDICAID

## 2023-04-02 VITALS
BODY MASS INDEX: 36.16 KG/M2 | OXYGEN SATURATION: 97 % | HEART RATE: 95 BPM | SYSTOLIC BLOOD PRESSURE: 126 MMHG | TEMPERATURE: 97.4 F | WEIGHT: 225 LBS | RESPIRATION RATE: 15 BRPM | DIASTOLIC BLOOD PRESSURE: 84 MMHG | HEIGHT: 66 IN

## 2023-04-02 DIAGNOSIS — B34.9 VIRAL ILLNESS: ICD-10-CM

## 2023-04-02 DIAGNOSIS — R52 BODY ACHES: ICD-10-CM

## 2023-04-02 DIAGNOSIS — R19.7 DIARRHEA, UNSPECIFIED TYPE: Primary | ICD-10-CM

## 2023-04-02 LAB
ANION GAP SERPL CALC-SCNC: 5 MMOL/L (ref 3–18)
BASOPHILS # BLD: 0 K/UL (ref 0–0.1)
BASOPHILS NFR BLD: 0 % (ref 0–2)
BUN SERPL-MCNC: 15 MG/DL (ref 7–18)
BUN/CREAT SERPL: 11 (ref 12–20)
CALCIUM SERPL-MCNC: 8.2 MG/DL (ref 8.5–10.1)
CHLORIDE SERPL-SCNC: 112 MMOL/L (ref 100–111)
CO2 SERPL-SCNC: 20 MMOL/L (ref 21–32)
CREAT SERPL-MCNC: 1.32 MG/DL (ref 0.6–1.3)
D DIMER PPP FEU-MCNC: 0.47 UG/ML(FEU)
DIFFERENTIAL METHOD BLD: ABNORMAL
EOSINOPHIL # BLD: 0.1 K/UL (ref 0–0.4)
EOSINOPHIL NFR BLD: 2 % (ref 0–5)
ERYTHROCYTE [DISTWIDTH] IN BLOOD BY AUTOMATED COUNT: 15.1 % (ref 11.6–14.5)
GLUCOSE SERPL-MCNC: 130 MG/DL (ref 74–99)
HCT VFR BLD AUTO: 46.5 % (ref 36–48)
HGB BLD-MCNC: 15.1 G/DL (ref 13–16)
IMM GRANULOCYTES # BLD AUTO: 0 K/UL (ref 0–0.04)
IMM GRANULOCYTES NFR BLD AUTO: 0 % (ref 0–0.5)
LYMPHOCYTES # BLD: 1.7 K/UL (ref 0.9–3.6)
LYMPHOCYTES NFR BLD: 25 % (ref 21–52)
MCH RBC QN AUTO: 26.4 PG (ref 24–34)
MCHC RBC AUTO-ENTMCNC: 32.5 G/DL (ref 31–37)
MCV RBC AUTO: 81.2 FL (ref 78–100)
MONOCYTES # BLD: 1 K/UL (ref 0.05–1.2)
MONOCYTES NFR BLD: 14 % (ref 3–10)
NEUTS SEG # BLD: 4.1 K/UL (ref 1.8–8)
NEUTS SEG NFR BLD: 59 % (ref 40–73)
NRBC # BLD: 0 K/UL (ref 0–0.01)
NRBC BLD-RTO: 0 PER 100 WBC
PLATELET # BLD AUTO: 302 K/UL (ref 135–420)
PMV BLD AUTO: 10.1 FL (ref 9.2–11.8)
POTASSIUM SERPL-SCNC: 3.5 MMOL/L (ref 3.5–5.5)
RBC # BLD AUTO: 5.73 M/UL (ref 4.35–5.65)
SODIUM SERPL-SCNC: 137 MMOL/L (ref 136–145)
WBC # BLD AUTO: 6.9 K/UL (ref 4.6–13.2)

## 2023-04-02 PROCEDURE — 99285 EMERGENCY DEPT VISIT HI MDM: CPT

## 2023-04-02 PROCEDURE — 80048 BASIC METABOLIC PNL TOTAL CA: CPT

## 2023-04-02 PROCEDURE — 71046 X-RAY EXAM CHEST 2 VIEWS: CPT

## 2023-04-02 PROCEDURE — 93005 ELECTROCARDIOGRAM TRACING: CPT | Performed by: EMERGENCY MEDICINE

## 2023-04-02 PROCEDURE — 96374 THER/PROPH/DIAG INJ IV PUSH: CPT

## 2023-04-02 PROCEDURE — 96361 HYDRATE IV INFUSION ADD-ON: CPT

## 2023-04-02 PROCEDURE — 2580000003 HC RX 258: Performed by: EMERGENCY MEDICINE

## 2023-04-02 PROCEDURE — 85379 FIBRIN DEGRADATION QUANT: CPT

## 2023-04-02 PROCEDURE — 85025 COMPLETE CBC W/AUTO DIFF WBC: CPT

## 2023-04-02 PROCEDURE — 6360000002 HC RX W HCPCS: Performed by: EMERGENCY MEDICINE

## 2023-04-02 RX ORDER — 0.9 % SODIUM CHLORIDE 0.9 %
1000 INTRAVENOUS SOLUTION INTRAVENOUS ONCE
Status: COMPLETED | OUTPATIENT
Start: 2023-04-02 | End: 2023-04-02

## 2023-04-02 RX ORDER — KETOROLAC TROMETHAMINE 15 MG/ML
15 INJECTION, SOLUTION INTRAMUSCULAR; INTRAVENOUS
Status: COMPLETED | OUTPATIENT
Start: 2023-04-02 | End: 2023-04-02

## 2023-04-02 RX ADMIN — SODIUM CHLORIDE 1000 ML: 9 INJECTION, SOLUTION INTRAVENOUS at 20:05

## 2023-04-02 RX ADMIN — KETOROLAC TROMETHAMINE 15 MG: 15 INJECTION, SOLUTION INTRAMUSCULAR; INTRAVENOUS at 20:08

## 2023-04-02 ASSESSMENT — PAIN DESCRIPTION - LOCATION: LOCATION: BACK

## 2023-04-02 ASSESSMENT — PAIN DESCRIPTION - DESCRIPTORS: DESCRIPTORS: TIGHTNESS

## 2023-04-02 ASSESSMENT — ENCOUNTER SYMPTOMS
VOMITING: 0
RESPIRATORY NEGATIVE: 1
BLOOD IN STOOL: 0
DIARRHEA: 1
CONSTIPATION: 0
RECTAL PAIN: 0
NAUSEA: 0

## 2023-04-02 ASSESSMENT — PAIN - FUNCTIONAL ASSESSMENT: PAIN_FUNCTIONAL_ASSESSMENT: 0-10

## 2023-04-02 ASSESSMENT — PAIN SCALES - GENERAL
PAINLEVEL_OUTOF10: 4
PAINLEVEL_OUTOF10: 4

## 2023-04-02 ASSESSMENT — PAIN DESCRIPTION - ORIENTATION: ORIENTATION: LOWER

## 2023-04-02 NOTE — ED TRIAGE NOTES
Patient arrived to ER with complaints of chest pain and body aches. Patient started with diarrhea 2 days ago, chest pain started yesterday. Denies any shortness of breath or nausea or vomiting. States he took 2 covid tests that were negative.

## 2023-04-03 LAB
EKG ATRIAL RATE: 102 BPM
EKG DIAGNOSIS: NORMAL
EKG P AXIS: 53 DEGREES
EKG P-R INTERVAL: 140 MS
EKG Q-T INTERVAL: 324 MS
EKG QRS DURATION: 76 MS
EKG QTC CALCULATION (BAZETT): 422 MS
EKG R AXIS: 8 DEGREES
EKG T AXIS: 27 DEGREES
EKG VENTRICULAR RATE: 102 BPM

## 2023-04-03 PROCEDURE — 93010 ELECTROCARDIOGRAM REPORT: CPT | Performed by: INTERNAL MEDICINE

## 2023-04-03 NOTE — DISCHARGE SUMMARY
Patient discharge instructions given with patient verbalizing understanding, IV discontinued with catheter intact.

## 2023-04-03 NOTE — ED PROVIDER NOTES
HYDROCHLOROTHIAZIDE (HYDRODIURIL) 25 MG TABLET    Take 25 mg by mouth daily    METOPROLOL TARTRATE PO    Take by mouth       ALLERGIES     Erythromycin, Amoxicillin, and Azithromycin    FAMILY HISTORY       Family History   Problem Relation Age of Onset    Diabetes Mother     Hypertension Mother           SOCIAL HISTORY       Social History     Socioeconomic History    Marital status:      Spouse name: None    Number of children: None    Years of education: None    Highest education level: None   Tobacco Use    Smoking status: Every Day     Packs/day: 0.50     Types: Cigarettes    Smokeless tobacco: Never   Substance and Sexual Activity    Alcohol use: Yes    Drug use: No       SCREENINGS         Tiana Coma Scale  Eye Opening: Spontaneous  Best Verbal Response: Oriented  Best Motor Response: Obeys commands  Tiana Coma Scale Score: 15                     CIWA Assessment  BP: (!) 155/105  Heart Rate: 91                 PHYSICAL EXAM    (up to 7 for level 4, 8 or more for level 5)     ED Triage Vitals [04/02/23 1956]   BP Temp Temp Source Heart Rate Resp SpO2 Height Weight   139/74 97.4 °F (36.3 °C) Temporal (!) 105 16 98 % 5' 6\" (1.676 m) 225 lb (102.1 kg)       Physical Exam  Vitals and nursing note reviewed. Constitutional:       General: He is not in acute distress. Appearance: Normal appearance. He is not ill-appearing, toxic-appearing or diaphoretic. HENT:      Head: Normocephalic and atraumatic. Nose: Nose normal. No congestion or rhinorrhea. Mouth/Throat:      Pharynx: No oropharyngeal exudate or posterior oropharyngeal erythema. Eyes:      Extraocular Movements: Extraocular movements intact. Pupils: Pupils are equal, round, and reactive to light. Neck:      Vascular: No carotid bruit. Cardiovascular:      Rate and Rhythm: Normal rate and regular rhythm. Heart sounds: No murmur heard. No friction rub. No gallop.    Pulmonary:      Effort: Pulmonary effort is

## 2023-04-03 NOTE — DISCHARGE INSTRUCTIONS
Come back if you get worse. Use loperamide for diarrhea. Also do brat diet as we discussed. Follow-up with your doctor without fail.

## 2025-03-06 ENCOUNTER — HOSPITAL ENCOUNTER (EMERGENCY)
Facility: HOSPITAL | Age: 40
Discharge: HOME | End: 2025-03-06
Attending: EMERGENCY MEDICINE
Payer: COMMERCIAL

## 2025-03-06 ENCOUNTER — APPOINTMENT (OUTPATIENT)
Facility: HOSPITAL | Age: 40
End: 2025-03-06
Payer: COMMERCIAL

## 2025-03-06 VITALS
OXYGEN SATURATION: 96 % | BODY MASS INDEX: 36.96 KG/M2 | HEART RATE: 78 BPM | RESPIRATION RATE: 23 BRPM | TEMPERATURE: 98.6 F | SYSTOLIC BLOOD PRESSURE: 166 MMHG | WEIGHT: 230 LBS | HEIGHT: 66 IN | DIASTOLIC BLOOD PRESSURE: 91 MMHG

## 2025-03-06 DIAGNOSIS — R10.9 ACUTE LEFT FLANK PAIN: Primary | ICD-10-CM

## 2025-03-06 LAB
ALBUMIN SERPL-MCNC: 3.7 G/DL (ref 3.4–5)
ALBUMIN/GLOB SERPL: 1 (ref 0.8–1.7)
ALP SERPL-CCNC: 65 U/L (ref 45–117)
ALT SERPL-CCNC: 60 U/L (ref 16–61)
ANION GAP SERPL CALC-SCNC: 5 MMOL/L (ref 3–18)
APPEARANCE UR: CLEAR
AST SERPL-CCNC: 34 U/L (ref 10–38)
BACTERIA URNS QL MICRO: NEGATIVE /HPF
BASOPHILS # BLD: 0.03 K/UL (ref 0–0.1)
BASOPHILS NFR BLD: 0.3 % (ref 0–2)
BILIRUB SERPL-MCNC: 0.2 MG/DL (ref 0.2–1)
BILIRUB UR QL: NEGATIVE
BUN SERPL-MCNC: 17 MG/DL (ref 7–18)
BUN/CREAT SERPL: 16 (ref 12–20)
CALCIUM SERPL-MCNC: 8.6 MG/DL (ref 8.5–10.1)
CHLORIDE SERPL-SCNC: 111 MMOL/L (ref 100–111)
CO2 SERPL-SCNC: 25 MMOL/L (ref 21–32)
COLOR UR: YELLOW
CREAT SERPL-MCNC: 1.05 MG/DL (ref 0.6–1.3)
DIFFERENTIAL METHOD BLD: ABNORMAL
EOSINOPHIL # BLD: 0.15 K/UL (ref 0–0.4)
EOSINOPHIL NFR BLD: 1.7 % (ref 0–5)
EPITH CASTS URNS QL MICRO: NEGATIVE /LPF (ref 0–5)
ERYTHROCYTE [DISTWIDTH] IN BLOOD BY AUTOMATED COUNT: 15 % (ref 11.6–14.5)
GLOBULIN SER CALC-MCNC: 3.8 G/DL (ref 2–4)
GLUCOSE SERPL-MCNC: 111 MG/DL (ref 74–99)
GLUCOSE UR STRIP.AUTO-MCNC: NEGATIVE MG/DL
HCT VFR BLD AUTO: 38.4 % (ref 36–48)
HGB BLD-MCNC: 12.8 G/DL (ref 13–16)
HGB UR QL STRIP: NEGATIVE
IMM GRANULOCYTES # BLD AUTO: 0.02 K/UL (ref 0–0.04)
IMM GRANULOCYTES NFR BLD AUTO: 0.2 % (ref 0–0.5)
KETONES UR QL STRIP.AUTO: NEGATIVE MG/DL
LEUKOCYTE ESTERASE UR QL STRIP.AUTO: NEGATIVE
LIPASE SERPL-CCNC: 50 U/L (ref 13–75)
LYMPHOCYTES # BLD: 2.51 K/UL (ref 0.9–3.6)
LYMPHOCYTES NFR BLD: 27.8 % (ref 21–52)
MCH RBC QN AUTO: 27.5 PG (ref 24–34)
MCHC RBC AUTO-ENTMCNC: 33.3 G/DL (ref 31–37)
MCV RBC AUTO: 82.4 FL (ref 78–100)
MONOCYTES # BLD: 0.84 K/UL (ref 0.05–1.2)
MONOCYTES NFR BLD: 9.3 % (ref 3–10)
NEUTS SEG # BLD: 5.47 K/UL (ref 1.8–8)
NEUTS SEG NFR BLD: 60.7 % (ref 40–73)
NITRITE UR QL STRIP.AUTO: NEGATIVE
NRBC # BLD: 0 K/UL (ref 0–0.01)
NRBC BLD-RTO: 0 PER 100 WBC
PH UR STRIP: 6 (ref 5–8)
PLATELET # BLD AUTO: 251 K/UL (ref 135–420)
PMV BLD AUTO: 9.9 FL (ref 9.2–11.8)
POTASSIUM SERPL-SCNC: 3.5 MMOL/L (ref 3.5–5.5)
PROT SERPL-MCNC: 7.5 G/DL (ref 6.4–8.2)
PROT UR STRIP-MCNC: ABNORMAL MG/DL
RBC # BLD AUTO: 4.66 M/UL (ref 4.35–5.65)
RBC #/AREA URNS HPF: NEGATIVE /HPF (ref 0–5)
SODIUM SERPL-SCNC: 141 MMOL/L (ref 136–145)
SP GR UR REFRACTOMETRY: 1.02 (ref 1–1.03)
UROBILINOGEN UR QL STRIP.AUTO: 0.2 EU/DL (ref 0.2–1)
WBC # BLD AUTO: 9 K/UL (ref 4.6–13.2)
WBC URNS QL MICRO: NORMAL /HPF (ref 0–4)

## 2025-03-06 PROCEDURE — 85025 COMPLETE CBC W/AUTO DIFF WBC: CPT

## 2025-03-06 PROCEDURE — 81001 URINALYSIS AUTO W/SCOPE: CPT

## 2025-03-06 PROCEDURE — 6360000002 HC RX W HCPCS: Performed by: EMERGENCY MEDICINE

## 2025-03-06 PROCEDURE — 99284 EMERGENCY DEPT VISIT MOD MDM: CPT

## 2025-03-06 PROCEDURE — 83690 ASSAY OF LIPASE: CPT

## 2025-03-06 PROCEDURE — 74176 CT ABD & PELVIS W/O CONTRAST: CPT

## 2025-03-06 PROCEDURE — 96374 THER/PROPH/DIAG INJ IV PUSH: CPT

## 2025-03-06 PROCEDURE — 80053 COMPREHEN METABOLIC PANEL: CPT

## 2025-03-06 PROCEDURE — 93005 ELECTROCARDIOGRAM TRACING: CPT | Performed by: EMERGENCY MEDICINE

## 2025-03-06 RX ORDER — KETOROLAC TROMETHAMINE 15 MG/ML
15 INJECTION, SOLUTION INTRAMUSCULAR; INTRAVENOUS
Status: COMPLETED | OUTPATIENT
Start: 2025-03-06 | End: 2025-03-06

## 2025-03-06 RX ORDER — IBUPROFEN 600 MG/1
600 TABLET, FILM COATED ORAL 3 TIMES DAILY PRN
Qty: 30 TABLET | Refills: 0 | Status: SHIPPED | OUTPATIENT
Start: 2025-03-06

## 2025-03-06 RX ADMIN — KETOROLAC TROMETHAMINE 15 MG: 15 INJECTION, SOLUTION INTRAMUSCULAR; INTRAVENOUS at 20:29

## 2025-03-06 ASSESSMENT — PAIN DESCRIPTION - LOCATION
LOCATION: ABDOMEN;BACK

## 2025-03-06 ASSESSMENT — PAIN - FUNCTIONAL ASSESSMENT
PAIN_FUNCTIONAL_ASSESSMENT: ACTIVITIES ARE NOT PREVENTED
PAIN_FUNCTIONAL_ASSESSMENT: 0-10
PAIN_FUNCTIONAL_ASSESSMENT: ACTIVITIES ARE NOT PREVENTED
PAIN_FUNCTIONAL_ASSESSMENT: 0-10

## 2025-03-06 ASSESSMENT — PAIN SCALES - GENERAL
PAINLEVEL_OUTOF10: 9
PAINLEVEL_OUTOF10: 9
PAINLEVEL_OUTOF10: 0
PAINLEVEL_OUTOF10: 7
PAINLEVEL_OUTOF10: 0

## 2025-03-06 ASSESSMENT — LIFESTYLE VARIABLES
HOW OFTEN DO YOU HAVE A DRINK CONTAINING ALCOHOL: 2-3 TIMES A WEEK
HOW MANY STANDARD DRINKS CONTAINING ALCOHOL DO YOU HAVE ON A TYPICAL DAY: 7 TO 9

## 2025-03-06 ASSESSMENT — PAIN DESCRIPTION - FREQUENCY: FREQUENCY: CONTINUOUS

## 2025-03-06 ASSESSMENT — PAIN DESCRIPTION - ONSET: ONSET: PROGRESSIVE

## 2025-03-06 ASSESSMENT — PAIN DESCRIPTION - ORIENTATION
ORIENTATION: LOWER
ORIENTATION: OTHER (COMMENT)
ORIENTATION: OTHER (COMMENT)

## 2025-03-06 ASSESSMENT — PAIN DESCRIPTION - DESCRIPTORS
DESCRIPTORS: ACHING
DESCRIPTORS: BURNING;CRAMPING;SHOOTING
DESCRIPTORS: DISCOMFORT

## 2025-03-06 ASSESSMENT — ENCOUNTER SYMPTOMS
GASTROINTESTINAL NEGATIVE: 1
RESPIRATORY NEGATIVE: 1

## 2025-03-06 ASSESSMENT — PAIN DESCRIPTION - PAIN TYPE: TYPE: ACUTE PAIN

## 2025-03-07 LAB
EKG ATRIAL RATE: 83 BPM
EKG DIAGNOSIS: NORMAL
EKG P AXIS: 50 DEGREES
EKG P-R INTERVAL: 160 MS
EKG Q-T INTERVAL: 352 MS
EKG QRS DURATION: 76 MS
EKG QTC CALCULATION (BAZETT): 413 MS
EKG R AXIS: 9 DEGREES
EKG T AXIS: 17 DEGREES
EKG VENTRICULAR RATE: 83 BPM

## 2025-03-07 PROCEDURE — 93010 ELECTROCARDIOGRAM REPORT: CPT | Performed by: INTERNAL MEDICINE

## 2025-03-07 NOTE — ED TRIAGE NOTES
At bedside pt resting eyes closed respirations even and unlabored at this time. Cardiac monitor in place, VSS , NAD noted at this time. Voiced no issues or concerns at this time.  Pt awaiting CT scan, pt aware

## 2025-03-07 NOTE — ED TRIAGE NOTES
Patient presents to ER ambulatory with c/o left lower back pain that radiates to mid lower abd that started about 1 hour ago.

## 2025-03-07 NOTE — ED PROVIDER NOTES
-- 03/06/25 1922 03/06/25 1922 03/06/25 1922 03/06/25 1922 03/06/25 1922   (!) 175/101   98.1 °F (36.7 °C) Oral 83 16 97 %      Height Weight - Scale         03/06/25 1918 03/06/25 1918         1.676 m (5' 6\") 104.3 kg (230 lb)             Physical Exam  HENT:      Head: Normocephalic.   Cardiovascular:      Rate and Rhythm: Normal rate and regular rhythm.   Pulmonary:      Effort: Pulmonary effort is normal.      Breath sounds: Normal breath sounds.   Abdominal:      General: Abdomen is flat. Bowel sounds are increased.      Palpations: Abdomen is soft.      Tenderness: There is no abdominal tenderness. There is no right CVA tenderness or left CVA tenderness.      Hernia: No hernia is present.   Skin:     General: Skin is warm.   Neurological:      General: No focal deficit present.      Mental Status: He is alert.   Psychiatric:         Mood and Affect: Mood normal.       Recent Results (from the past 24 hour(s))   CBC with Auto Differential    Collection Time: 03/06/25  7:25 PM   Result Value Ref Range    WBC 9.0 4.6 - 13.2 K/uL    RBC 4.66 4.35 - 5.65 M/uL    Hemoglobin 12.8 (L) 13.0 - 16.0 g/dL    Hematocrit 38.4 36.0 - 48.0 %    MCV 82.4 78.0 - 100.0 FL    MCH 27.5 24.0 - 34.0 PG    MCHC 33.3 31.0 - 37.0 g/dL    RDW 15.0 (H) 11.6 - 14.5 %    Platelets 251 135 - 420 K/uL    MPV 9.9 9.2 - 11.8 FL    Nucleated RBCs 0.0 0  WBC    nRBC 0.00 0.00 - 0.01 K/uL    Neutrophils % 60.7 40.0 - 73.0 %    Lymphocytes % 27.8 21.0 - 52.0 %    Monocytes % 9.3 3.0 - 10.0 %    Eosinophils % 1.7 0.0 - 5.0 %    Basophils % 0.3 0.0 - 2.0 %    Immature Granulocytes % 0.2 0.0 - 0.5 %    Neutrophils Absolute 5.47 1.80 - 8.00 K/UL    Lymphocytes Absolute 2.51 0.90 - 3.60 K/UL    Monocytes Absolute 0.84 0.05 - 1.20 K/UL    Eosinophils Absolute 0.15 0.00 - 0.40 K/UL    Basophils Absolute 0.03 0.00 - 0.10 K/UL    Immature Granulocytes Absolute 0.02 0.00 - 0.04 K/UL    Differential Type AUTOMATED     Comprehensive Metabolic Panel

## 2025-03-07 NOTE — ED NOTES
Pt resting on stretcher, cardiac monitor in place, call bell in reach. Pt awaiting Ct results at this  time, no urine obtained urinal at bedside for urine specimen. Voiced no issues or concerns at this time

## 2025-07-21 ENCOUNTER — HOSPITAL ENCOUNTER (OUTPATIENT)
Facility: HOSPITAL | Age: 40
Setting detail: SPECIMEN
Discharge: HOME | End: 2025-07-24

## 2025-07-21 LAB — SENTARA SPECIMEN COLLECTION: NORMAL

## 2025-07-21 PROCEDURE — 99001 SPECIMEN HANDLING PT-LAB: CPT
